# Patient Record
Sex: FEMALE | Race: WHITE | Employment: UNEMPLOYED | URBAN - METROPOLITAN AREA
[De-identification: names, ages, dates, MRNs, and addresses within clinical notes are randomized per-mention and may not be internally consistent; named-entity substitution may affect disease eponyms.]

---

## 2022-05-07 ENCOUNTER — HOSPITAL ENCOUNTER (EMERGENCY)
Age: 27
Discharge: LEFT BEFORE DISCHARGE | End: 2022-05-07
Attending: EMERGENCY MEDICINE
Payer: MEDICAID

## 2022-05-07 ENCOUNTER — APPOINTMENT (OUTPATIENT)
Dept: CT IMAGING | Age: 27
End: 2022-05-07
Attending: EMERGENCY MEDICINE
Payer: MEDICAID

## 2022-05-07 VITALS
BODY MASS INDEX: 24.8 KG/M2 | WEIGHT: 140 LBS | HEIGHT: 63 IN | SYSTOLIC BLOOD PRESSURE: 129 MMHG | OXYGEN SATURATION: 100 % | RESPIRATION RATE: 18 BRPM | DIASTOLIC BLOOD PRESSURE: 76 MMHG | HEART RATE: 92 BPM | TEMPERATURE: 98.1 F

## 2022-05-07 DIAGNOSIS — R41.82 ALTERED MENTAL STATUS, UNSPECIFIED ALTERED MENTAL STATUS TYPE: Primary | ICD-10-CM

## 2022-05-07 DIAGNOSIS — R11.2 NAUSEA AND VOMITING, UNSPECIFIED VOMITING TYPE: ICD-10-CM

## 2022-05-07 LAB
ALBUMIN SERPL-MCNC: 3.7 G/DL (ref 3.4–5)
ALBUMIN/GLOB SERPL: 0.9 {RATIO} (ref 0.8–1.7)
ALP SERPL-CCNC: 81 U/L (ref 45–117)
ALT SERPL-CCNC: 15 U/L (ref 13–56)
ANION GAP SERPL CALC-SCNC: 7 MMOL/L (ref 3–18)
APAP SERPL-MCNC: <2 UG/ML (ref 10–30)
AST SERPL-CCNC: 18 U/L (ref 10–38)
BASOPHILS # BLD: 0 K/UL (ref 0–0.1)
BASOPHILS NFR BLD: 0 % (ref 0–2)
BILIRUB SERPL-MCNC: 0.7 MG/DL (ref 0.2–1)
BUN SERPL-MCNC: 13 MG/DL (ref 7–18)
BUN/CREAT SERPL: 15 (ref 12–20)
CALCIUM SERPL-MCNC: 9.4 MG/DL (ref 8.5–10.1)
CHLORIDE SERPL-SCNC: 105 MMOL/L (ref 100–111)
CO2 SERPL-SCNC: 27 MMOL/L (ref 21–32)
CREAT SERPL-MCNC: 0.84 MG/DL (ref 0.6–1.3)
DIFFERENTIAL METHOD BLD: ABNORMAL
EOSINOPHIL # BLD: 0.2 K/UL (ref 0–0.4)
EOSINOPHIL NFR BLD: 1 % (ref 0–5)
ERYTHROCYTE [DISTWIDTH] IN BLOOD BY AUTOMATED COUNT: 12.4 % (ref 11.6–14.5)
ETHANOL SERPL-MCNC: 4 MG/DL (ref 0–3)
GLOBULIN SER CALC-MCNC: 4.2 G/DL (ref 2–4)
GLUCOSE SERPL-MCNC: 87 MG/DL (ref 74–99)
HCG SERPL QL: NEGATIVE
HCT VFR BLD AUTO: 34.1 % (ref 35–45)
HGB BLD-MCNC: 11 G/DL (ref 12–16)
IMM GRANULOCYTES # BLD AUTO: 0 K/UL
IMM GRANULOCYTES NFR BLD AUTO: 0 %
INR PPP: 1.1 (ref 0.8–1.2)
LIPASE SERPL-CCNC: 72 U/L (ref 73–393)
LYMPHOCYTES # BLD: 1.4 K/UL (ref 0.9–3.6)
LYMPHOCYTES NFR BLD: 9 % (ref 21–52)
MAGNESIUM SERPL-MCNC: 1.7 MG/DL (ref 1.6–2.6)
MCH RBC QN AUTO: 29 PG (ref 24–34)
MCHC RBC AUTO-ENTMCNC: 32.3 G/DL (ref 31–37)
MCV RBC AUTO: 90 FL (ref 78–100)
MONOCYTES # BLD: 0.2 K/UL (ref 0.05–1.2)
MONOCYTES NFR BLD: 1 % (ref 3–10)
NEUTS SEG # BLD: 14.3 K/UL (ref 1.8–8)
NEUTS SEG NFR BLD: 89 % (ref 40–73)
NRBC # BLD: 0 K/UL (ref 0–0.01)
NRBC BLD-RTO: 0 PER 100 WBC
PLATELET # BLD AUTO: 242 K/UL (ref 135–420)
PLATELET COMMENTS,PCOM: ABNORMAL
PMV BLD AUTO: 10.5 FL (ref 9.2–11.8)
POTASSIUM SERPL-SCNC: 3.7 MMOL/L (ref 3.5–5.5)
PROT SERPL-MCNC: 7.9 G/DL (ref 6.4–8.2)
PROTHROMBIN TIME: 14.2 SEC (ref 11.5–15.2)
RBC # BLD AUTO: 3.79 M/UL (ref 4.2–5.3)
RBC MORPH BLD: ABNORMAL
SALICYLATES SERPL-MCNC: <1.7 MG/DL (ref 2.8–20)
SODIUM SERPL-SCNC: 139 MMOL/L (ref 136–145)
WBC # BLD AUTO: 16.1 K/UL (ref 4.6–13.2)

## 2022-05-07 PROCEDURE — 99284 EMERGENCY DEPT VISIT MOD MDM: CPT

## 2022-05-07 PROCEDURE — 82077 ASSAY SPEC XCP UR&BREATH IA: CPT

## 2022-05-07 PROCEDURE — 84703 CHORIONIC GONADOTROPIN ASSAY: CPT

## 2022-05-07 PROCEDURE — 85025 COMPLETE CBC W/AUTO DIFF WBC: CPT

## 2022-05-07 PROCEDURE — 83735 ASSAY OF MAGNESIUM: CPT

## 2022-05-07 PROCEDURE — 80143 DRUG ASSAY ACETAMINOPHEN: CPT

## 2022-05-07 PROCEDURE — 80053 COMPREHEN METABOLIC PANEL: CPT

## 2022-05-07 PROCEDURE — 85610 PROTHROMBIN TIME: CPT

## 2022-05-07 PROCEDURE — 83690 ASSAY OF LIPASE: CPT

## 2022-05-07 PROCEDURE — 80179 DRUG ASSAY SALICYLATE: CPT

## 2022-05-07 PROCEDURE — 74011250636 HC RX REV CODE- 250/636: Performed by: EMERGENCY MEDICINE

## 2022-05-07 RX ORDER — LORAZEPAM 2 MG/ML
1 INJECTION, SOLUTION INTRAMUSCULAR; INTRAVENOUS
Status: DISCONTINUED | OUTPATIENT
Start: 2022-05-07 | End: 2022-05-07 | Stop reason: HOSPADM

## 2022-05-07 RX ORDER — METRONIDAZOLE 500 MG/1
TABLET ORAL
COMMUNITY
Start: 2022-05-06

## 2022-05-07 RX ORDER — ONDANSETRON 4 MG/1
TABLET, ORALLY DISINTEGRATING ORAL
COMMUNITY
Start: 2022-05-06

## 2022-05-07 RX ORDER — DOXYCYCLINE 100 MG/1
CAPSULE ORAL
COMMUNITY
Start: 2022-05-06

## 2022-05-07 RX ADMIN — SODIUM CHLORIDE 1000 ML: 900 INJECTION, SOLUTION INTRAVENOUS at 06:37

## 2022-05-07 NOTE — ED NOTES
Bedside and Verbal shift change report given to 1540 Disha Alanis (oncoming nurse) by Varsha Enriquez RN (offgoing nurse). Report included the following information SBAR, Kardex, ED Summary, Procedure Summary, Intake/Output and MAR.

## 2022-05-07 NOTE — ED PROVIDER NOTES
EMERGENCY DEPARTMENT HISTORY AND PHYSICAL EXAM    6:24 AM  Date: 5/7/2022  Patient Name: Amirah Carbone    History of Presenting Illness     Chief Complaint   Patient presents with    Nausea    Leg Pain        History Provided By: Patient and boyfriend    HPI: Amirah Carbone is a 31 y.o. 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 Patient is presenting with nausea, vomiting and left leg pain. She was seen at [facility] yesterday per the patient and her boyfriend and was diagnosed with left lower extremity DVT, UTI and BV. She was not able to  her prescriptions from the pharmacy yet and went home and took some Azo for her dysuria and since then she has been feeling nauseous and is complaining of blurry vision and \"not feeling right\". The boyfriend is also corroborating the same story. Patient states that her leg pain is been the same in her left lower extremity is covered in bruises. Denies weakness or numbness. She is complaining of double vision but denies headache, weakness or numbness. Patient is denying any drug use or alcohol intake. She states that she has not been taking her Coumadin either. Location:  Severity:  Timing/course: Onset/Duration:     PCP: Adriana Henderson MD    Past History     Past Medical History:  Past Medical History:   Diagnosis Date    Thromboembolus [facility])        Past Surgical History:  Past Surgical History:   Procedure Laterality Date    HX THROMBECTOMY         Family History:  No family history on file. Social History:  Social History     Tobacco Use    Smoking status: Never Smoker    Smokeless tobacco: Never Used   Substance Use Topics    Alcohol use: No    Drug use: Not on file       Allergies: Allergies   Allergen Reactions    Zithromax [Azithromycin] Hives       Review of Systems   Review of Systems   Eyes: Positive for visual disturbance. Cardiovascular: Positive for leg swelling.    Gastrointestinal: Positive for nausea and vomiting. Genitourinary: Positive for difficulty urinating and dysuria. Musculoskeletal: Positive for myalgias. All other systems reviewed and are negative. Physical Exam     Patient Vitals for the past 12 hrs:   Temp Pulse Resp BP SpO2   05/07/22 0554 98.1 °F (36.7 °C) 92 18 129/76 100 %       Physical Exam  Vitals and nursing note reviewed. Constitutional:       Comments: Drowsy but answering questions   HENT:      Head: Normocephalic and atraumatic. Nose: Nose normal.      Mouth/Throat:      Mouth: Mucous membranes are dry. Eyes:      Extraocular Movements: Extraocular movements intact. Conjunctiva/sclera: Conjunctivae normal.      Pupils: Pupils are equal, round, and reactive to light. Cardiovascular:      Rate and Rhythm: Normal rate. Pulses: Normal pulses. Pulmonary:      Effort: Pulmonary effort is normal. No respiratory distress. Abdominal:      Palpations: Abdomen is soft. Tenderness: There is no abdominal tenderness. Musculoskeletal:         General: No deformity. Normal range of motion. Cervical back: Normal range of motion and neck supple. No rigidity. Left lower leg: Edema present. Skin:     General: Skin is warm and dry. Findings: Bruising present. Neurological:      General: No focal deficit present. Mental Status: She is oriented to person, place, and time. Motor: No weakness. Gait: Gait normal.   Psychiatric:         Attention and Perception: She is inattentive. Mood and Affect: Mood is elated. Speech: Speech is slurred. Behavior: Behavior is hyperactive. Diagnostic Study Results     Labs -  No results found for this or any previous visit (from the past 12 hour(s)). Radiologic Studies -   No results found. Medical Decision Making     ED Course: Progress Notes, Reevaluation, and Consults:    6:24 AM Initial assessment performed.  The patients presenting problems have been discussed, and they/their family are in agreement with the care plan formulated and outlined with them. I have encouraged them to ask questions as they arise throughout their visit.    7:21 AM  When the nurse went to give the patient Ativan the patient was touching her IV and there is asked to not touch it because she did not want to be pulled out and the patient and her boyfriend got overly aggravated and were demanding to be discharged. I went to the room and tried to de-escalate the situation with boyfriend was not letting her talk and kept insisting on being discharged. Security were called and he would not leave the room and then the patient was very adamant that  she also wanted to leave and get treated elsewhere. I explained to them that nobody was trying to disrespect them and I was asking for a chance to explain what was going on medically however he kept interacting, I eventually was able to get him to calm down so I can explain what is going on and explained to both of them that I am concerned about a brain bleed since she is having double vision and not acting normal and she is on Coumadin for a blood clot in her brain and also concerned about a blood infection given that her white count was elevated even though she did not meet SIRS criteria and wanted to do more testing, the patient got very hyperactive and aggressive and very adamant that she wants to sign out AMA. I explained to her in details that she could have worsening of her condition, permanent disability or death. She stated that she will seek out medical attention elsewhere and she does not want to be here because she was told to not touch her IV. The boyfriend was really pushing for her to leave and would not leave her side and she did not want to leave her side so I could not talk to her without his presence. However there were both alert and oriented and she answered all questions appropriately and signed out AMA.   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 Patient is presenting with vomiting, double vision and altered mental status. She is very drowsy but restless on exam.  She is oriented and answering all questions but appears to be intoxicated. She is denying any drug use or alcohol. Her left lower extremity is markedly swollen and covered in bruises however compartments are soft. Her neuro exam is overall intact, she is ambulating with steady gait without assistance and moving all of her extremities, however she is not really cooperating with a full detailed exam.  I could not find in her chart where she was seen yesterday, unclear if it was under different date of birth or name spelling. There is nothing in the patient's chart also indicating substance use. At this point I will obtain screening labs including tox work-up as well as a head CT to evaluate for intracranial hemorrhage or other causes of her mental status change. She is afebrile and not tachycardic, not meeting SIRS criteria. We will hold off on antibiotics and septic work-up unless indicated. Since she is very restless and needs a head CT I will order her a dose of Ativan to be given. Procedures:     Critical Care Time:     Vital Signs-Reviewed the patient's vital signs. Reviewed pt's pulse ox reading. EKG: Interpreted by the EP. Time Interpreted:    Rate:    Rhythm:    Interpretation:   Comparison:     Records Reviewed: Nursing Notes and Old Medical Records (Time of Review: 6:24 AM)  -I am the first provider for this patient.  -I reviewed the vital signs, available nursing notes, past medical history, past surgical history, family history and social history.     Current Outpatient Medications   Medication Sig Dispense Refill    doxycycline (MONODOX) 100 mg capsule       metroNIDAZOLE (FLAGYL) 500 mg tablet  ondansetron (ZOFRAN ODT) 4 mg disintegrating tablet       warfarin (COUMADIN) 5 mg tablet Take 5 mg by mouth daily. Indications: DEEP VENOUS THROMBOSIS          Clinical Impression     Clinical Impression: No diagnosis found. Disposition: AMA      This note was dictated utilizing voice recognition software which may lead to typographical errors. I apologize in advance if the situation occurs. If questions arise please do not hesitate to contact me or call our department.     Jennifer Tijerina MD  6:24 AM

## 2022-05-07 NOTE — ED TRIAGE NOTES
Patient arrived with significant other. C/O not feeling well after taking an AZO tablet 1 hr ago. Patient c/o nausea and dizziness. Patient states she was at [facility] last night for leg pain and diagnosed with a blood clot in her leg and UTI.

## 2022-05-07 NOTE — ED NOTES
This RN asked the pt to not remove her IV. Pt yelled \"It is a part of my body & I can touch it whenever & however I would like. Do not tell me what to do. \" Situation escalated at this time. Pt stated she wanted to be discharged. Rebecca RANDLE notified at this time. Anum Mo MD at the bedside, d/t pt & boyfriend continuing to yell & threaten staff. Pt & pt's boyfriend continued to yell & scream at staff. Pt left AMA at this time. IV removed by [facility] ED tech.